# Patient Record
Sex: FEMALE | Race: WHITE | Employment: OTHER | URBAN - NONMETROPOLITAN AREA
[De-identification: names, ages, dates, MRNs, and addresses within clinical notes are randomized per-mention and may not be internally consistent; named-entity substitution may affect disease eponyms.]

---

## 2017-01-27 RX ORDER — ISOSORBIDE DINITRATE 10 MG/1
TABLET ORAL
Qty: 180 TABLET | Refills: 2 | Status: SHIPPED | OUTPATIENT
Start: 2017-01-27 | End: 2017-03-02 | Stop reason: SDUPTHER

## 2017-02-27 ENCOUNTER — TELEPHONE (OUTPATIENT)
Dept: INTERNAL MEDICINE | Age: 82
End: 2017-02-27

## 2017-03-01 LAB
ABSOLUTE BASO #: 0 /CMM (ref 0–200)
ABSOLUTE EOS #: 100 /CMM (ref 0–500)
ABSOLUTE LYMPH #: 900 /CMM (ref 1000–4800)
ABSOLUTE MONO #: 800 /CMM (ref 0–800)
ABSOLUTE NEUT #: 6700 /CMM (ref 1800–7700)
ANION GAP SERPL CALCULATED.3IONS-SCNC: 13 MMOL/L (ref 4–12)
BASOPHILS RELATIVE PERCENT: 0.6 % (ref 0–2)
BUN BLDV-MCNC: 24 MG/DL (ref 7–20)
CALCIUM SERPL-MCNC: 9.6 MG/DL (ref 8.8–10.5)
CHLORIDE BLD-SCNC: 105 MEQ/L (ref 101–111)
CO2: 24 MEQ/L (ref 21–32)
CREAT SERPL-MCNC: 1.3 MG/DL (ref 0.6–1.3)
CREATININE CLEARANCE: 39
EOSINOPHILS RELATIVE PERCENT: 1 % (ref 0–6)
GLUCOSE: 104 MG/DL (ref 70–110)
HCT VFR BLD CALC: 42.7 % (ref 35–44)
HEMOGLOBIN: 14.2 GM/DL (ref 12–15)
LYMPHOCYTES RELATIVE PERCENT: 10.9 % (ref 15–45)
MCH RBC QN AUTO: 29.8 PG (ref 27.5–33)
MCHC RBC AUTO-ENTMCNC: 33.3 GM/DL (ref 33–36)
MCV RBC AUTO: 89.6 CU MIC (ref 80–97)
MONOCYTES RELATIVE PERCENT: 9.1 % (ref 2–10)
NEUTROPHILS RELATIVE PERCENT: 78.4 % (ref 40–70)
NUCLEATED RBCS: 0 /100 WBC
PDW BLD-RTO: 14.6 % (ref 12–16)
PLATELET # BLD: 185 TH/CMM (ref 150–400)
POTASSIUM SERPL-SCNC: 4.3 MEQ/L (ref 3.6–5)
RBC # BLD: 4.76 MIL/CMM (ref 4–5.1)
SODIUM BLD-SCNC: 142 MEQ/L (ref 135–145)
TSH SERPL DL<=0.05 MIU/L-ACNC: 0.31 MCIU/ML (ref 0.49–4.67)
WBC # BLD: 8.6 TH/CMM (ref 4.4–10.5)

## 2017-03-02 ENCOUNTER — OFFICE VISIT (OUTPATIENT)
Dept: INTERNAL MEDICINE | Age: 82
End: 2017-03-02

## 2017-03-02 VITALS
HEIGHT: 58 IN | BODY MASS INDEX: 35.18 KG/M2 | OXYGEN SATURATION: 96 % | SYSTOLIC BLOOD PRESSURE: 170 MMHG | WEIGHT: 167.6 LBS | DIASTOLIC BLOOD PRESSURE: 66 MMHG | HEART RATE: 76 BPM

## 2017-03-02 DIAGNOSIS — Z95.810 AUTOMATIC IMPLANTABLE CARDIOVERTER-DEFIBRILLATOR IN SITU: ICD-10-CM

## 2017-03-02 DIAGNOSIS — E78.5 DYSLIPIDEMIA: ICD-10-CM

## 2017-03-02 DIAGNOSIS — Z95.1 S/P CABG (CORONARY ARTERY BYPASS GRAFT): Primary | ICD-10-CM

## 2017-03-02 DIAGNOSIS — N18.30 CKD (CHRONIC KIDNEY DISEASE) STAGE 3, GFR 30-59 ML/MIN (HCC): ICD-10-CM

## 2017-03-02 DIAGNOSIS — E03.8 OTHER SPECIFIED HYPOTHYROIDISM: ICD-10-CM

## 2017-03-02 PROCEDURE — 4040F PNEUMOC VAC/ADMIN/RCVD: CPT | Performed by: INTERNAL MEDICINE

## 2017-03-02 PROCEDURE — 1090F PRES/ABSN URINE INCON ASSESS: CPT | Performed by: INTERNAL MEDICINE

## 2017-03-02 PROCEDURE — 1036F TOBACCO NON-USER: CPT | Performed by: INTERNAL MEDICINE

## 2017-03-02 PROCEDURE — G8598 ASA/ANTIPLAT THER USED: HCPCS | Performed by: INTERNAL MEDICINE

## 2017-03-02 PROCEDURE — 99214 OFFICE O/P EST MOD 30 MIN: CPT | Performed by: INTERNAL MEDICINE

## 2017-03-02 PROCEDURE — G8427 DOCREV CUR MEDS BY ELIG CLIN: HCPCS | Performed by: INTERNAL MEDICINE

## 2017-03-02 PROCEDURE — 1123F ACP DISCUSS/DSCN MKR DOCD: CPT | Performed by: INTERNAL MEDICINE

## 2017-03-02 PROCEDURE — G8419 CALC BMI OUT NRM PARAM NOF/U: HCPCS | Performed by: INTERNAL MEDICINE

## 2017-03-02 PROCEDURE — G8400 PT W/DXA NO RESULTS DOC: HCPCS | Performed by: INTERNAL MEDICINE

## 2017-03-02 PROCEDURE — G8484 FLU IMMUNIZE NO ADMIN: HCPCS | Performed by: INTERNAL MEDICINE

## 2017-03-02 RX ORDER — LEVOTHYROXINE SODIUM 0.03 MG/1
TABLET ORAL
Qty: 90 TABLET | Refills: 1 | Status: SHIPPED | OUTPATIENT
Start: 2017-03-02 | End: 2017-08-12 | Stop reason: SDUPTHER

## 2017-03-02 RX ORDER — ISOSORBIDE DINITRATE 10 MG/1
TABLET ORAL
Qty: 180 TABLET | Refills: 1 | Status: SHIPPED | OUTPATIENT
Start: 2017-03-02 | End: 2017-08-30 | Stop reason: SDUPTHER

## 2017-03-02 RX ORDER — CARVEDILOL 12.5 MG/1
TABLET ORAL
Qty: 180 TABLET | Refills: 1 | Status: SHIPPED | OUTPATIENT
Start: 2017-03-02 | End: 2017-08-06 | Stop reason: SDUPTHER

## 2017-03-02 ASSESSMENT — PATIENT HEALTH QUESTIONNAIRE - PHQ9
1. LITTLE INTEREST OR PLEASURE IN DOING THINGS: 0
2. FEELING DOWN, DEPRESSED OR HOPELESS: 0
SUM OF ALL RESPONSES TO PHQ QUESTIONS 1-9: 0
SUM OF ALL RESPONSES TO PHQ9 QUESTIONS 1 & 2: 0

## 2017-08-14 RX ORDER — LEVOTHYROXINE SODIUM 0.03 MG/1
TABLET ORAL
Qty: 90 TABLET | Refills: 1 | Status: SHIPPED | OUTPATIENT
Start: 2017-08-14 | End: 2018-02-20

## 2017-08-14 RX ORDER — CARVEDILOL 12.5 MG/1
12.5 TABLET ORAL 2 TIMES DAILY WITH MEALS
Qty: 180 TABLET | Refills: 1 | Status: SHIPPED | OUTPATIENT
Start: 2017-08-14 | End: 2018-02-20 | Stop reason: SDUPTHER

## 2017-08-21 LAB
ALBUMIN SERPL-MCNC: 4.2 GM/DL (ref 3.5–5)
ALP BLD-CCNC: 102 IU/L (ref 39–118)
ALT SERPL-CCNC: 17 IU/L (ref 10–40)
ANION GAP SERPL CALCULATED.3IONS-SCNC: 8 MMOL/L (ref 4–12)
AST SERPL-CCNC: 25 IU/L (ref 15–41)
BILIRUB SERPL-MCNC: 1.5 MG/DL (ref 0.2–1)
BILIRUBIN DIRECT: 0.3 MG/DL (ref 0.1–0.2)
BUN BLDV-MCNC: 30 MG/DL (ref 7–20)
CALCIUM SERPL-MCNC: 9.1 MG/DL (ref 8.8–10.5)
CHLORIDE BLD-SCNC: 107 MEQ/L (ref 101–111)
CHOLESTEROL/HDL RELATIVE RISK: 4.2 (ref 4–4.4)
CHOLESTEROL: 173 MG/DL
CO2: 27 MEQ/L (ref 21–32)
CREAT SERPL-MCNC: 1.31 MG/DL (ref 0.6–1.3)
CREATININE CLEARANCE: 38
DIRECT-LDL / HDL RISK: 3.4
GLUCOSE, FASTING: 108 MG/DL (ref 70–110)
HDLC SERPL-MCNC: 41 MG/DL
LDL CHOLESTEROL DIRECT: 141 MG/DL
POTASSIUM SERPL-SCNC: 4.8 MEQ/L (ref 3.6–5)
SODIUM BLD-SCNC: 142 MEQ/L (ref 135–145)
TOTAL PROTEIN: 7.4 G/DL (ref 6.2–8)
TRIGL SERPL-MCNC: 105 MG/DL
TSH SERPL DL<=0.05 MIU/L-ACNC: 9.46 MCIU/ML (ref 0.49–4.67)
VLDLC SERPL CALC-MCNC: 21 MG/DL

## 2017-08-30 ENCOUNTER — OFFICE VISIT (OUTPATIENT)
Dept: INTERNAL MEDICINE CLINIC | Age: 82
End: 2017-08-30
Payer: MEDICARE

## 2017-08-30 VITALS
DIASTOLIC BLOOD PRESSURE: 60 MMHG | HEIGHT: 58 IN | SYSTOLIC BLOOD PRESSURE: 120 MMHG | HEART RATE: 68 BPM | BODY MASS INDEX: 35.26 KG/M2 | WEIGHT: 168 LBS

## 2017-08-30 DIAGNOSIS — N18.30 CKD (CHRONIC KIDNEY DISEASE) STAGE 3, GFR 30-59 ML/MIN (HCC): ICD-10-CM

## 2017-08-30 DIAGNOSIS — I10 ESSENTIAL HYPERTENSION: ICD-10-CM

## 2017-08-30 DIAGNOSIS — E78.5 DYSLIPIDEMIA: ICD-10-CM

## 2017-08-30 DIAGNOSIS — Z95.1 S/P CABG (CORONARY ARTERY BYPASS GRAFT): Primary | ICD-10-CM

## 2017-08-30 PROCEDURE — 99214 OFFICE O/P EST MOD 30 MIN: CPT | Performed by: INTERNAL MEDICINE

## 2017-08-30 PROCEDURE — 1123F ACP DISCUSS/DSCN MKR DOCD: CPT | Performed by: INTERNAL MEDICINE

## 2017-08-30 PROCEDURE — G8427 DOCREV CUR MEDS BY ELIG CLIN: HCPCS | Performed by: INTERNAL MEDICINE

## 2017-08-30 PROCEDURE — G8417 CALC BMI ABV UP PARAM F/U: HCPCS | Performed by: INTERNAL MEDICINE

## 2017-08-30 PROCEDURE — 1090F PRES/ABSN URINE INCON ASSESS: CPT | Performed by: INTERNAL MEDICINE

## 2017-08-30 PROCEDURE — 1036F TOBACCO NON-USER: CPT | Performed by: INTERNAL MEDICINE

## 2017-08-30 PROCEDURE — 4040F PNEUMOC VAC/ADMIN/RCVD: CPT | Performed by: INTERNAL MEDICINE

## 2017-08-30 PROCEDURE — G8598 ASA/ANTIPLAT THER USED: HCPCS | Performed by: INTERNAL MEDICINE

## 2017-08-30 RX ORDER — ISOSORBIDE DINITRATE 10 MG/1
TABLET ORAL
Qty: 180 TABLET | Refills: 1 | Status: SHIPPED | OUTPATIENT
Start: 2017-08-30 | End: 2018-02-20 | Stop reason: SDUPTHER

## 2018-01-01 ENCOUNTER — OFFICE VISIT (OUTPATIENT)
Dept: INTERNAL MEDICINE CLINIC | Age: 83
End: 2018-01-01
Payer: MEDICARE

## 2018-01-01 ENCOUNTER — TELEPHONE (OUTPATIENT)
Dept: INTERNAL MEDICINE CLINIC | Age: 83
End: 2018-01-01

## 2018-01-01 ENCOUNTER — CARE COORDINATION (OUTPATIENT)
Dept: CARE COORDINATION | Age: 83
End: 2018-01-01

## 2018-01-01 ENCOUNTER — HOSPITAL ENCOUNTER (OUTPATIENT)
Age: 83
Discharge: HOME | End: 2018-08-02
Payer: MEDICARE

## 2018-01-01 ENCOUNTER — CARE COORDINATION (OUTPATIENT)
Dept: CASE MANAGEMENT | Age: 83
End: 2018-01-01

## 2018-01-01 VITALS
BODY MASS INDEX: 32.32 KG/M2 | DIASTOLIC BLOOD PRESSURE: 64 MMHG | HEART RATE: 60 BPM | SYSTOLIC BLOOD PRESSURE: 120 MMHG | OXYGEN SATURATION: 94 % | WEIGHT: 154 LBS | HEIGHT: 58 IN

## 2018-01-01 VITALS
BODY MASS INDEX: 34.43 KG/M2 | WEIGHT: 164 LBS | RESPIRATION RATE: 20 BRPM | SYSTOLIC BLOOD PRESSURE: 121 MMHG | HEART RATE: 80 BPM | HEIGHT: 58 IN | DIASTOLIC BLOOD PRESSURE: 58 MMHG

## 2018-01-01 DIAGNOSIS — I10 ESSENTIAL HYPERTENSION: ICD-10-CM

## 2018-01-01 DIAGNOSIS — I25.10 CORONARY ARTERY DISEASE INVOLVING NATIVE CORONARY ARTERY OF NATIVE HEART WITHOUT ANGINA PECTORIS: ICD-10-CM

## 2018-01-01 DIAGNOSIS — R19.7 DIARRHEA, UNSPECIFIED TYPE: ICD-10-CM

## 2018-01-01 DIAGNOSIS — E03.9 ACQUIRED HYPOTHYROIDISM: ICD-10-CM

## 2018-01-01 DIAGNOSIS — I50.23 ACUTE ON CHRONIC SYSTOLIC CONGESTIVE HEART FAILURE (HCC): Primary | ICD-10-CM

## 2018-01-01 DIAGNOSIS — N18.30 CKD (CHRONIC KIDNEY DISEASE) STAGE 3, GFR 30-59 ML/MIN (HCC): ICD-10-CM

## 2018-01-01 DIAGNOSIS — I10 ESSENTIAL HYPERTENSION: Primary | ICD-10-CM

## 2018-01-01 DIAGNOSIS — R05.9 COUGH: ICD-10-CM

## 2018-01-01 DIAGNOSIS — E78.5 DYSLIPIDEMIA: ICD-10-CM

## 2018-01-01 DIAGNOSIS — I50.22 CHRONIC SYSTOLIC CHF (CONGESTIVE HEART FAILURE) (HCC): Primary | ICD-10-CM

## 2018-01-01 DIAGNOSIS — E03.9 ACQUIRED HYPOTHYROIDISM: Primary | ICD-10-CM

## 2018-01-01 LAB
ANION GAP SERPL CALCULATED.3IONS-SCNC: 17 MEQ/L (ref 8–16)
ANION GAP SERPL CALCULATED.3IONS-SCNC: 7 MMOL/L (ref 4–12)
ANION GAP SERPL CALCULATED.3IONS-SCNC: 8 MMOL/L (ref 4–12)
APPEARANCE: CLEAR
BACTERIA: ABNORMAL
BILIRUBIN URINE: NEGATIVE
BUN BLDV-MCNC: 29 MG/DL (ref 7–20)
BUN BLDV-MCNC: 32 MG/DL (ref 7–20)
BUN BLDV-MCNC: 56 MG/DL (ref 7–22)
CALCIUM SERPL-MCNC: 10.1 MG/DL (ref 8.5–10.5)
CALCIUM SERPL-MCNC: 8.5 MG/DL (ref 8.8–10.5)
CALCIUM SERPL-MCNC: 8.9 MG/DL (ref 8.8–10.5)
CHLORIDE BLD-SCNC: 104 MEQ/L (ref 101–111)
CHLORIDE BLD-SCNC: 104 MEQ/L (ref 98–111)
CHLORIDE BLD-SCNC: 107 MEQ/L (ref 101–111)
CO2: 24 MEQ/L (ref 23–33)
CO2: 26 MEQ/L (ref 21–32)
CO2: 27 MEQ/L (ref 21–32)
COLOR: YELLOW
CREAT SERPL-MCNC: 1.35 MG/DL (ref 0.6–1.3)
CREAT SERPL-MCNC: 1.42 MG/DL (ref 0.6–1.3)
CREAT SERPL-MCNC: 2 MG/DL (ref 0.4–1.2)
CREATININE CLEARANCE: 35
CREATININE CLEARANCE: 37
CREATININE, RANDOM URINE: 75.2 MG/DL
GFR SERPL CREATININE-BSD FRML MDRD: 24 ML/MIN/1.73M2
GLUCOSE BLD-MCNC: 176 MG/DL (ref 70–108)
GLUCOSE URINE: NEGATIVE
GLUCOSE: 107 MG/DL (ref 70–110)
GLUCOSE: 115 MG/DL (ref 70–110)
HYALINE CASTS: ABNORMAL /LPF
INR BLD: 1.5 (ref 2–3)
INR BLD: 1.9 (ref 2–3)
INR BLD: 2.1 (ref 2–3)
INR BLD: 2.3 (ref 2–3)
INR BLD: 2.4 (ref 2–3)
INR BLD: 2.6 (ref 2–3)
INR BLD: 2.6 (ref 2–3)
INR BLD: 2.9 (ref 2–3)
KETONES, URINE: NEGATIVE
LEUKOCYTES, UA: ABNORMAL
MUCUS: PRESENT
NITRITE, URINE: NEGATIVE
PH, URINE: 5 (ref 5–8)
POTASSIUM SERPL-SCNC: 3.6 MEQ/L (ref 3.6–5)
POTASSIUM SERPL-SCNC: 3.9 MEQ/L (ref 3.6–5)
POTASSIUM SERPL-SCNC: 4.2 MEQ/L (ref 3.6–5)
POTASSIUM SERPL-SCNC: 4.7 MEQ/L (ref 3.5–5.2)
PRO-BNP: 2851 PG/ML (ref 0–1800)
PROTEIN, URINE, RANDOM: 9 MG/DL
PROTEIN, URINE: NEGATIVE
PROTEIN/CREAT RATIO: 0.1 G/1.73M2
PROTHROMBIN TIME: 18 SEC (ref 8.7–13.3)
PROTHROMBIN TIME: 23.1 SEC (ref 8.7–13.3)
PROTHROMBIN TIME: 24.6 SEC (ref 8.7–13.3)
PROTHROMBIN TIME: 27.6 SEC (ref 8.7–13.3)
PROTHROMBIN TIME: 28.7 SEC (ref 8.7–13.3)
PROTHROMBIN TIME: 30.7 SEC (ref 8.7–13.3)
PROTHROMBIN TIME: 31.4 SEC (ref 8.7–13.3)
PROTHROMBIN TIME: 35.3 SEC (ref 8.7–13.3)
RBC URINE: ABNORMAL /HPF
SODIUM BLD-SCNC: 139 MEQ/L (ref 135–145)
SODIUM BLD-SCNC: 140 MEQ/L (ref 135–145)
SODIUM BLD-SCNC: 145 MEQ/L (ref 135–145)
SPECIFIC GRAVITY, URINE: 1.01 (ref 1–1.03)
SQUAMOUS EPITHELIAL: ABNORMAL /HPF
T4 FREE: 2.08 NG/DL (ref 0.93–1.76)
TSH REFLEX: 0.89 MCIU/ML (ref 0.49–4.67)
TSH SERPL DL<=0.05 MIU/L-ACNC: 0.34 UIU/ML (ref 0.4–4.2)
URINALYSIS REFLEX: NO
URINE CULTURE, ROUTINE: NORMAL
URINE HGB: NEGATIVE
UROBILINOGEN, URINE: ABNORMAL (ref 0.2–1)
WBC URINE: ABNORMAL /HPF

## 2018-01-01 PROCEDURE — 1101F PT FALLS ASSESS-DOCD LE1/YR: CPT | Performed by: INTERNAL MEDICINE

## 2018-01-01 PROCEDURE — 1123F ACP DISCUSS/DSCN MKR DOCD: CPT | Performed by: INTERNAL MEDICINE

## 2018-01-01 PROCEDURE — 99213 OFFICE O/P EST LOW 20 MIN: CPT | Performed by: INTERNAL MEDICINE

## 2018-01-01 PROCEDURE — G8417 CALC BMI ABV UP PARAM F/U: HCPCS | Performed by: INTERNAL MEDICINE

## 2018-01-01 PROCEDURE — 84439 ASSAY OF FREE THYROXINE: CPT

## 2018-01-01 PROCEDURE — 1036F TOBACCO NON-USER: CPT | Performed by: INTERNAL MEDICINE

## 2018-01-01 PROCEDURE — 1090F PRES/ABSN URINE INCON ASSESS: CPT | Performed by: INTERNAL MEDICINE

## 2018-01-01 PROCEDURE — 80048 BASIC METABOLIC PNL TOTAL CA: CPT

## 2018-01-01 PROCEDURE — 83880 ASSAY OF NATRIURETIC PEPTIDE: CPT

## 2018-01-01 PROCEDURE — 4040F PNEUMOC VAC/ADMIN/RCVD: CPT | Performed by: INTERNAL MEDICINE

## 2018-01-01 PROCEDURE — 84443 ASSAY THYROID STIM HORMONE: CPT

## 2018-01-01 PROCEDURE — G8427 DOCREV CUR MEDS BY ELIG CLIN: HCPCS | Performed by: INTERNAL MEDICINE

## 2018-01-01 PROCEDURE — 99214 OFFICE O/P EST MOD 30 MIN: CPT | Performed by: INTERNAL MEDICINE

## 2018-01-01 PROCEDURE — G8598 ASA/ANTIPLAT THER USED: HCPCS | Performed by: INTERNAL MEDICINE

## 2018-01-01 PROCEDURE — 36415 COLL VENOUS BLD VENIPUNCTURE: CPT

## 2018-01-01 RX ORDER — TORSEMIDE 10 MG/1
10 TABLET ORAL 2 TIMES DAILY
COMMUNITY

## 2018-01-01 RX ORDER — POTASSIUM CHLORIDE 750 MG/1
10 TABLET, EXTENDED RELEASE ORAL DAILY
COMMUNITY
End: 2018-01-01 | Stop reason: SDUPTHER

## 2018-01-01 RX ORDER — ISOSORBIDE DINITRATE 10 MG/1
TABLET ORAL
Qty: 180 TABLET | Refills: 1 | Status: SHIPPED | OUTPATIENT
Start: 2018-01-01 | End: 2019-01-01 | Stop reason: SDUPTHER

## 2018-01-01 RX ORDER — PRAVASTATIN SODIUM 10 MG
10 TABLET ORAL DAILY
Qty: 90 TABLET | Refills: 1 | Status: SHIPPED | OUTPATIENT
Start: 2018-01-01 | End: 2019-01-01 | Stop reason: SDUPTHER

## 2018-01-01 RX ORDER — LOSARTAN POTASSIUM 25 MG/1
TABLET ORAL
Qty: 30 TABLET | Refills: 5 | Status: SHIPPED | OUTPATIENT
Start: 2018-01-01 | End: 2019-01-01 | Stop reason: ALTCHOICE

## 2018-01-01 RX ORDER — HYDRALAZINE HYDROCHLORIDE 10 MG/1
10 TABLET, FILM COATED ORAL 2 TIMES DAILY
COMMUNITY

## 2018-01-01 RX ORDER — LEVOTHYROXINE SODIUM 0.03 MG/1
25 TABLET ORAL DAILY
COMMUNITY
End: 2018-01-01 | Stop reason: SDUPTHER

## 2018-01-01 RX ORDER — LEVOTHYROXINE SODIUM 0.03 MG/1
25 TABLET ORAL DAILY
Qty: 90 TABLET | Refills: 1 | OUTPATIENT
Start: 2018-01-01 | End: 2018-01-01

## 2018-01-01 RX ORDER — TORSEMIDE 10 MG/1
10 TABLET ORAL 2 TIMES DAILY
COMMUNITY
End: 2018-01-01 | Stop reason: DRUGHIGH

## 2018-01-01 RX ORDER — LEVOTHYROXINE SODIUM 0.03 MG/1
25 TABLET ORAL DAILY
Qty: 30 TABLET | Refills: 3 | Status: SHIPPED | OUTPATIENT
Start: 2018-01-01 | End: 2019-01-01 | Stop reason: SDUPTHER

## 2018-01-01 RX ORDER — LOSARTAN POTASSIUM 25 MG/1
25 TABLET ORAL DAILY
Qty: 30 TABLET | Refills: 3 | OUTPATIENT
Start: 2018-01-01 | End: 2018-01-01 | Stop reason: ALTCHOICE

## 2018-01-01 RX ORDER — LEVOCETIRIZINE DIHYDROCHLORIDE 5 MG/1
5 TABLET, FILM COATED ORAL DAILY
COMMUNITY
End: 2019-01-01 | Stop reason: ALTCHOICE

## 2018-01-01 RX ORDER — POTASSIUM CHLORIDE 750 MG/1
10 TABLET, EXTENDED RELEASE ORAL DAILY
Qty: 90 TABLET | Refills: 1 | OUTPATIENT
Start: 2018-01-01 | End: 2019-01-01 | Stop reason: DRUGHIGH

## 2018-01-01 RX ORDER — ISOSORBIDE DINITRATE 10 MG/1
TABLET ORAL
Qty: 180 TABLET | Refills: 1 | Status: SHIPPED | OUTPATIENT
Start: 2018-01-01 | End: 2018-01-01 | Stop reason: SDUPTHER

## 2018-01-01 RX ORDER — GUAIFENESIN AND DEXTROMETHORPHAN HYDROBROMIDE 100; 10 MG/5ML; MG/5ML
5 SOLUTION ORAL EVERY 4 HOURS PRN
COMMUNITY

## 2018-02-09 ENCOUNTER — HOSPITAL ENCOUNTER (EMERGENCY)
Age: 83
Discharge: ANOTHER HOSPITAL | End: 2018-02-09
Payer: MEDICARE

## 2018-02-09 VITALS
SYSTOLIC BLOOD PRESSURE: 209 MMHG | OXYGEN SATURATION: 89 % | TEMPERATURE: 97.5 F | HEART RATE: 62 BPM | BODY MASS INDEX: 34.07 KG/M2 | WEIGHT: 163 LBS | RESPIRATION RATE: 32 BRPM | DIASTOLIC BLOOD PRESSURE: 97 MMHG

## 2018-02-09 DIAGNOSIS — R07.2 PRECORDIAL PAIN: ICD-10-CM

## 2018-02-09 DIAGNOSIS — I25.9 CHEST PAIN DUE TO MYOCARDIAL ISCHEMIA, UNSPECIFIED ISCHEMIC CHEST PAIN TYPE: Primary | ICD-10-CM

## 2018-02-09 LAB
EKG ATRIAL RATE: 340 BPM
EKG Q-T INTERVAL: 490 MS
EKG QRS DURATION: 174 MS
EKG QTC CALCULATION (BAZETT): 521 MS
EKG R AXIS: -52 DEGREES
EKG T AXIS: 114 DEGREES
EKG VENTRICULAR RATE: 68 BPM

## 2018-02-09 PROCEDURE — 99215 OFFICE O/P EST HI 40 MIN: CPT

## 2018-02-09 PROCEDURE — 99203 OFFICE O/P NEW LOW 30 MIN: CPT | Performed by: NURSE PRACTITIONER

## 2018-02-09 ASSESSMENT — ENCOUNTER SYMPTOMS
SHORTNESS OF BREATH: 1
VOMITING: 0
ORTHOPNEA: 0
COUGH: 1
NAUSEA: 0

## 2018-02-09 NOTE — ED PROVIDER NOTES
artery bypass graft; joint replacement; back surgery; Dental surgery; Gallbladder surgery; and Colonoscopy. CURRENT MEDICATIONS       Discharge Medication List as of 2/9/2018 12:12 PM      CONTINUE these medications which have NOT CHANGED    Details   furosemide (LASIX) 40 MG tablet Take 40 mg by mouth every other day       isosorbide dinitrate (ISORDIL) 10 MG tablet TAKE 1 TABLET TWICE A DAY (IMDUR STOPPED), Disp-180 tablet, R-1Normal      carvedilol (COREG) 12.5 MG tablet Take 1 tablet by mouth 2 times daily (with meals), Disp-180 tablet, R-1Normal      levothyroxine (SYNTHROID) 25 MCG tablet TAKE 1 TABLET DAILY IN THE MORNING (NEW LOWER DOSE), Disp-90 tablet, R-1Normal      Misc. Devices (ROLLER WALKER) MISC DAILY Starting 11/17/2016, Until Discontinued, Disp-1 each, R-0, PrintWheeled walker with seat . aspirin EC 81 MG EC tablet Take 81 mg by mouth daily      potassium chloride SA (K-DUR;KLOR-CON M) 20 MEQ tablet 1 tab on Mon. Wed. & Fri.      warfarin (COUMADIN) 2.5 MG tablet Followed at [facility]      amiodarone (PACERONE) 100 MG tablet Take 100 mg by mouth daily      pravastatin (PRAVACHOL) 10 MG tablet Take 10 mg by mouth daily      losartan (COZAAR) 100 MG tablet Take 1 tablet by mouth daily. , Disp-90 tablet, R-2      nitroGLYCERIN (NITROSTAT) 0.4 MG SL tablet Place 1 tablet under the tongue every 5 minutes as needed. , Disp-25 tablet, R-5      Coenzyme Q10 (CO Q 10) 100 MG CAPS Take  by mouth daily. acetaminophen (TYLENOL ARTHRITIS PAIN) 650 MG CR tablet Take 650 mg by mouth nightly as needed. ALLERGIES     Patient is has No Known Allergies. FAMILY HISTORY     Patient's family history includes Stroke in her father. SOCIAL HISTORY     Patient  reports that she has never smoked. She has never used smokeless tobacco. She reports that she does not drink alcohol or use drugs.     PHYSICAL EXAM     ED TRIAGE VITALS  BP: (!) 209/97, Temp: 97.5 °F (36.4 °C), Pulse: 62, Resp: (!) 32, SpO2: (!) 89 %  Physical Exam   Constitutional: She is oriented to person, place, and time. She appears well-developed and well-nourished. No distress. Neck: Normal range of motion. Neck supple. Cardiovascular: Normal rate, regular rhythm and normal heart sounds. Pulmonary/Chest: Effort normal and breath sounds normal.   Abdominal: Soft. Bowel sounds are normal.   Musculoskeletal: Normal range of motion. Neurological: She is alert and oriented to person, place, and time. Skin: Skin is warm and dry. No rash noted. Mild bilateral edema lower legs   Psychiatric: She has a normal mood and affect. Her behavior is normal. Judgment and thought content normal.   Nursing note and vitals reviewed. DIAGNOSTIC RESULTS   Labs:  Results for orders placed or performed during the hospital encounter of 02/09/18   EKG 12 Lead   Result Value Ref Range    Ventricular Rate 68 BPM    Atrial Rate 340 BPM    QRS Duration 174 ms    Q-T Interval 490 ms    QTc Calculation (Bazett) 521 ms    R Axis -52 degrees    T Axis 114 degrees       IMAGING:  No orders to display     URGENT CARE COURSE:     Vitals:    02/09/18 1140   BP: (!) 209/97   Pulse: 62   Resp: (!) 32   Temp: 97.5 °F (36.4 °C)   SpO2: (!) 89%   Weight: 163 lb (73.9 kg)       Medications - No data to display  PROCEDURES:  None  FINAL IMPRESSION      1. Chest pain due to myocardial ischemia, unspecified ischemic chest pain type ([address]    2.  Precordial pain        DISPOSITION/PLAN   DISPOSITION Decision To Transfer 02/09/2018 12:04:21 PM    PATIENT REFERRED TO:  [facility]      go now    DISCHARGE MEDICATIONS:  Discharge Medication List as of 2/9/2018 12:12 PM        Discharge Medication List as of 2/9/2018 12:12 PM          Pedro Luis Hoyos, [address] Mary[address] Fine, CNP  02/11/18 [address] [place] [address], [facility]  02/11/18 [id number]

## 2018-02-20 ENCOUNTER — OFFICE VISIT (OUTPATIENT)
Dept: INTERNAL MEDICINE CLINIC | Age: 83
End: 2018-02-20
Payer: MEDICARE

## 2018-02-20 VITALS
BODY MASS INDEX: 34.43 KG/M2 | HEIGHT: 58 IN | SYSTOLIC BLOOD PRESSURE: 132 MMHG | WEIGHT: 164 LBS | HEART RATE: 64 BPM | OXYGEN SATURATION: 92 % | DIASTOLIC BLOOD PRESSURE: 60 MMHG

## 2018-02-20 DIAGNOSIS — N18.30 CKD (CHRONIC KIDNEY DISEASE) STAGE 3, GFR 30-59 ML/MIN (HCC): ICD-10-CM

## 2018-02-20 DIAGNOSIS — E03.9 ACQUIRED HYPOTHYROIDISM: ICD-10-CM

## 2018-02-20 DIAGNOSIS — E78.5 DYSLIPIDEMIA: ICD-10-CM

## 2018-02-20 DIAGNOSIS — N30.01 ACUTE CYSTITIS WITH HEMATURIA: Primary | ICD-10-CM

## 2018-02-20 DIAGNOSIS — I25.10 CORONARY ARTERY DISEASE INVOLVING NATIVE CORONARY ARTERY OF NATIVE HEART WITHOUT ANGINA PECTORIS: ICD-10-CM

## 2018-02-20 DIAGNOSIS — I10 ESSENTIAL HYPERTENSION: ICD-10-CM

## 2018-02-20 PROCEDURE — 1090F PRES/ABSN URINE INCON ASSESS: CPT | Performed by: INTERNAL MEDICINE

## 2018-02-20 PROCEDURE — 4040F PNEUMOC VAC/ADMIN/RCVD: CPT | Performed by: INTERNAL MEDICINE

## 2018-02-20 PROCEDURE — 1036F TOBACCO NON-USER: CPT | Performed by: INTERNAL MEDICINE

## 2018-02-20 PROCEDURE — G8427 DOCREV CUR MEDS BY ELIG CLIN: HCPCS | Performed by: INTERNAL MEDICINE

## 2018-02-20 PROCEDURE — G8598 ASA/ANTIPLAT THER USED: HCPCS | Performed by: INTERNAL MEDICINE

## 2018-02-20 PROCEDURE — 1123F ACP DISCUSS/DSCN MKR DOCD: CPT | Performed by: INTERNAL MEDICINE

## 2018-02-20 PROCEDURE — G8484 FLU IMMUNIZE NO ADMIN: HCPCS | Performed by: INTERNAL MEDICINE

## 2018-02-20 PROCEDURE — 99214 OFFICE O/P EST MOD 30 MIN: CPT | Performed by: INTERNAL MEDICINE

## 2018-02-20 PROCEDURE — G8417 CALC BMI ABV UP PARAM F/U: HCPCS | Performed by: INTERNAL MEDICINE

## 2018-02-20 RX ORDER — LEVOTHYROXINE SODIUM 0.05 MG/1
50 TABLET ORAL DAILY
Qty: 90 TABLET | Refills: 1 | Status: SHIPPED | OUTPATIENT
Start: 2018-02-20 | End: 2018-05-01 | Stop reason: SDUPTHER

## 2018-02-20 RX ORDER — LEVOTHYROXINE SODIUM 0.03 MG/1
TABLET ORAL
Qty: 90 TABLET | Refills: 1 | Status: CANCELLED | OUTPATIENT
Start: 2018-02-20

## 2018-02-20 RX ORDER — PRAVASTATIN SODIUM 10 MG
10 TABLET ORAL DAILY
Qty: 90 TABLET | Refills: 1 | Status: SHIPPED | OUTPATIENT
Start: 2018-02-20 | End: 2018-05-01 | Stop reason: SDUPTHER

## 2018-02-20 RX ORDER — CARVEDILOL 12.5 MG/1
12.5 TABLET ORAL 2 TIMES DAILY WITH MEALS
Qty: 180 TABLET | Refills: 1 | Status: SHIPPED | OUTPATIENT
Start: 2018-02-20 | End: 2018-05-01 | Stop reason: DRUGHIGH

## 2018-02-20 RX ORDER — ISOSORBIDE DINITRATE 10 MG/1
TABLET ORAL
Qty: 180 TABLET | Refills: 1 | Status: SHIPPED | OUTPATIENT
Start: 2018-02-20 | End: 2018-03-09 | Stop reason: SDUPTHER

## 2018-02-20 NOTE — PROGRESS NOTES
benefit from insert in shoe. I asked her to have PT send me a note about what they thought would work best.  Or just send to [employer]. Past Medical History:   Diagnosis Date    CAD (coronary artery disease)     [address] (congestive heart failure) 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     Left ventricular dysfunction     S/P CABG (coronary artery bypass graft)        Past Surgical History:   Procedure Laterality Date    BACK SURGERY      COLONOSCOPY      CORONARY ARTERY BYPASS GRAFT      DENTAL SURGERY      GALLBLADDER SURGERY      JOINT REPLACEMENT      hip       Current Outpatient Prescriptions   Medication Sig Dispense Refill    carvedilol (COREG) 12.5 MG tablet Take 1 tablet by mouth 2 times daily (with meals) 180 tablet 1    isosorbide dinitrate (ISORDIL) 10 MG tablet TAKE 1 TABLET TWICE A DAY (IMDUR STOPPED) 180 tablet 1    pravastatin (PRAVACHOL) 10 MG tablet Take 1 tablet by mouth daily 90 tablet 1    levothyroxine (SYNTHROID) 50 MCG tablet Take 1 tablet by mouth Daily 90 tablet 1    aspirin EC 81 MG EC tablet Take 81 mg by mouth daily      potassium chloride SA (K-DUR;KLOR-CON M) 20 MEQ tablet 1 tab on Mon. Wed. & Fri.      warfarin (COUMADIN) 2.5 MG tablet Followed at [facility]      amiodarone (PACERONE) 100 MG tablet Take 100 mg by mouth daily      furosemide (LASIX) 40 MG tablet Take 40 mg by mouth every other day       nitroGLYCERIN (NITROSTAT) 0.4 MG SL tablet Place 1 tablet under the tongue every 5 minutes as needed. 25 tablet 5    Coenzyme Q10 (CO Q 10) 100 MG CAPS Take  by mouth daily.  acetaminophen (TYLENOL ARTHRITIS PAIN) 650 MG CR tablet Take 650 mg by mouth nightly as needed.  Misc. Devices (ROLLER WALKER) MISC 1 each by Does not apply route daily Wheeled walker with seat . 1 each 0     No current facility-administered medications for this visit.         No Known Allergies    Social History

## 2018-02-22 ENCOUNTER — TELEPHONE (OUTPATIENT)
Dept: INTERNAL MEDICINE CLINIC | Age: 83
End: 2018-02-22

## 2018-02-22 RX ORDER — FUROSEMIDE 20 MG/1
20 TABLET ORAL DAILY
COMMUNITY
End: 2018-02-22

## 2018-02-22 NOTE — TELEPHONE ENCOUNTER
Spoke to [facility] nurse and clarified the Lasix dose. AVS from 2/20/18 and medication list faxed to them.

## 2018-02-22 NOTE — TELEPHONE ENCOUNTER
[facility] nurse called wanting to clarify Lasix dose. According to their orders from pt's discharge she is to be on Lasix 20 mg daily. Pt's Rx bottle and pt stated she is on 40 mg every other day. Please clarify what does you want pt to be on.

## 2018-02-28 LAB
INR BLD: 2.8 (ref 2–3)
PROTHROMBIN TIME: 34.2 SEC (ref 8.7–13.3)

## 2018-03-04 LAB — URINE CULTURE, ROUTINE: NORMAL

## 2018-03-06 LAB
INR BLD: 2.2 (ref 2–3)
PROTHROMBIN TIME: 26.8 SEC (ref 8.7–13.3)

## 2018-03-07 ENCOUNTER — TELEPHONE (OUTPATIENT)
Dept: INTERNAL MEDICINE CLINIC | Age: 83
End: 2018-03-07

## 2018-03-07 NOTE — TELEPHONE ENCOUNTER
----- Message from Erica James MD sent at 3/4/2018  9:52 PM EST -----  Tell her to stop potassium - level high at 5.2, and ask what weight is now. Is she still taking Lasix daily? Repeat BMP when repeat TSH in a month.

## 2018-03-08 LAB
ANION GAP SERPL CALCULATED.3IONS-SCNC: 10 MMOL/L (ref 4–12)
BUN BLDV-MCNC: 26 MG/DL (ref 7–20)
CALCIUM SERPL-MCNC: 9.5 MG/DL (ref 8.8–10.5)
CHLORIDE BLD-SCNC: 102 MEQ/L (ref 101–111)
CO2: 26 MEQ/L (ref 21–32)
CREAT SERPL-MCNC: 1.24 MG/DL (ref 0.6–1.3)
CREATININE CLEARANCE: 41
GLUCOSE: 121 MG/DL (ref 70–110)
POTASSIUM SERPL-SCNC: 4.4 MEQ/L (ref 3.6–5)
SODIUM BLD-SCNC: 138 MEQ/L (ref 135–145)

## 2018-03-09 DIAGNOSIS — I25.10 CORONARY ARTERY DISEASE INVOLVING NATIVE CORONARY ARTERY OF NATIVE HEART WITHOUT ANGINA PECTORIS: ICD-10-CM

## 2018-03-09 LAB
APPEARANCE: ABNORMAL
BILIRUBIN URINE: NEGATIVE
COLOR: YELLOW
GLUCOSE URINE: NEGATIVE
HYALINE CASTS: ABNORMAL /LPF
KETONES, URINE: NEGATIVE
LEUKOCYTES, UA: ABNORMAL
MUCUS: PRESENT
NITRITE, URINE: NEGATIVE
PH, URINE: 5 (ref 5–8)
PROTEIN, URINE: NEGATIVE
RBC URINE: ABNORMAL /HPF
SPECIFIC GRAVITY, URINE: 1.02 (ref 1–1.03)
SQUAMOUS EPITHELIAL: ABNORMAL /HPF
URINALYSIS REFLEX: YES
URINE HGB: ABNORMAL
UROBILINOGEN, URINE: ABNORMAL (ref 0.2–1)
WBC URINE: ABNORMAL /HPF

## 2018-03-09 RX ORDER — ISOSORBIDE DINITRATE 10 MG/1
TABLET ORAL
Qty: 180 TABLET | Refills: 1 | Status: SHIPPED | OUTPATIENT
Start: 2018-03-09 | End: 2018-05-01 | Stop reason: SDUPTHER

## 2018-03-11 LAB — URINE CULTURE REFLEX: NORMAL

## 2018-03-19 LAB
INR BLD: 2.9 (ref 2–3)
PROTHROMBIN TIME: 34.9 SEC (ref 8.7–13.3)

## 2018-03-22 ENCOUNTER — TELEPHONE (OUTPATIENT)
Dept: INTERNAL MEDICINE CLINIC | Age: 83
End: 2018-03-22

## 2018-03-22 LAB
INR BLD: 2.9 (ref 2–3)
PROTHROMBIN TIME: 34.6 SEC (ref 8.7–13.3)

## 2018-04-09 LAB
INR BLD: 3.3 (ref 2–3)
PROTHROMBIN TIME: 39.2 SEC (ref 8.7–13.3)

## 2018-05-01 ENCOUNTER — OFFICE VISIT (OUTPATIENT)
Dept: INTERNAL MEDICINE CLINIC | Age: 83
End: 2018-05-01
Payer: MEDICARE

## 2018-05-01 VITALS
SYSTOLIC BLOOD PRESSURE: 120 MMHG | HEART RATE: 66 BPM | DIASTOLIC BLOOD PRESSURE: 60 MMHG | WEIGHT: 165 LBS | BODY MASS INDEX: 34.63 KG/M2 | OXYGEN SATURATION: 95 % | HEIGHT: 58 IN

## 2018-05-01 DIAGNOSIS — E03.9 ACQUIRED HYPOTHYROIDISM: ICD-10-CM

## 2018-05-01 DIAGNOSIS — I25.10 CORONARY ARTERY DISEASE INVOLVING NATIVE CORONARY ARTERY OF NATIVE HEART WITHOUT ANGINA PECTORIS: ICD-10-CM

## 2018-05-01 DIAGNOSIS — E78.5 DYSLIPIDEMIA: ICD-10-CM

## 2018-05-01 DIAGNOSIS — I10 ESSENTIAL HYPERTENSION: Primary | ICD-10-CM

## 2018-05-01 DIAGNOSIS — N18.30 CKD (CHRONIC KIDNEY DISEASE) STAGE 3, GFR 30-59 ML/MIN (HCC): ICD-10-CM

## 2018-05-01 DIAGNOSIS — Z23 NEED FOR PROPHYLACTIC VACCINATION AGAINST STREPTOCOCCUS PNEUMONIAE (PNEUMOCOCCUS) AND INFLUENZA: ICD-10-CM

## 2018-05-01 PROCEDURE — 4040F PNEUMOC VAC/ADMIN/RCVD: CPT | Performed by: INTERNAL MEDICINE

## 2018-05-01 PROCEDURE — G0009 ADMIN PNEUMOCOCCAL VACCINE: HCPCS | Performed by: INTERNAL MEDICINE

## 2018-05-01 PROCEDURE — 1123F ACP DISCUSS/DSCN MKR DOCD: CPT | Performed by: INTERNAL MEDICINE

## 2018-05-01 PROCEDURE — G8417 CALC BMI ABV UP PARAM F/U: HCPCS | Performed by: INTERNAL MEDICINE

## 2018-05-01 PROCEDURE — 1090F PRES/ABSN URINE INCON ASSESS: CPT | Performed by: INTERNAL MEDICINE

## 2018-05-01 PROCEDURE — G8427 DOCREV CUR MEDS BY ELIG CLIN: HCPCS | Performed by: INTERNAL MEDICINE

## 2018-05-01 PROCEDURE — 99213 OFFICE O/P EST LOW 20 MIN: CPT | Performed by: INTERNAL MEDICINE

## 2018-05-01 PROCEDURE — G8598 ASA/ANTIPLAT THER USED: HCPCS | Performed by: INTERNAL MEDICINE

## 2018-05-01 PROCEDURE — 90732 PPSV23 VACC 2 YRS+ SUBQ/IM: CPT | Performed by: INTERNAL MEDICINE

## 2018-05-01 PROCEDURE — 1036F TOBACCO NON-USER: CPT | Performed by: INTERNAL MEDICINE

## 2018-05-01 RX ORDER — LOSARTAN POTASSIUM 25 MG/1
25 TABLET ORAL DAILY
Qty: 30 TABLET | Refills: 3 | Status: SHIPPED | OUTPATIENT
Start: 2018-05-01 | End: 2018-01-01 | Stop reason: SDUPTHER

## 2018-05-01 RX ORDER — ISOSORBIDE DINITRATE 10 MG/1
TABLET ORAL
Qty: 180 TABLET | Refills: 1 | Status: SHIPPED | OUTPATIENT
Start: 2018-05-01 | End: 2018-01-01 | Stop reason: SDUPTHER

## 2018-05-01 RX ORDER — LISINOPRIL 5 MG/1
5 TABLET ORAL DAILY
COMMUNITY
End: 2018-05-01

## 2018-05-01 RX ORDER — LEVOTHYROXINE SODIUM 0.05 MG/1
50 TABLET ORAL DAILY
Qty: 90 TABLET | Refills: 1 | Status: SHIPPED | OUTPATIENT
Start: 2018-05-01 | End: 2018-01-01 | Stop reason: DRUGHIGH

## 2018-05-01 RX ORDER — PRAVASTATIN SODIUM 10 MG
10 TABLET ORAL DAILY
Qty: 90 TABLET | Refills: 1 | Status: SHIPPED | OUTPATIENT
Start: 2018-05-01 | End: 2018-01-01 | Stop reason: SDUPTHER

## 2018-05-01 RX ORDER — POTASSIUM CHLORIDE 20 MEQ/1
10 TABLET, EXTENDED RELEASE ORAL DAILY
COMMUNITY
End: 2018-01-01 | Stop reason: ALTCHOICE

## 2018-05-01 RX ORDER — CARVEDILOL 25 MG/1
25 TABLET ORAL 2 TIMES DAILY WITH MEALS
COMMUNITY

## 2018-05-01 ASSESSMENT — PATIENT HEALTH QUESTIONNAIRE - PHQ9
2. FEELING DOWN, DEPRESSED OR HOPELESS: 0
SUM OF ALL RESPONSES TO PHQ9 QUESTIONS 1 & 2: 0
1. LITTLE INTEREST OR PLEASURE IN DOING THINGS: 0
SUM OF ALL RESPONSES TO PHQ QUESTIONS 1-9: 0

## 2018-05-02 ENCOUNTER — TELEPHONE (OUTPATIENT)
Dept: INTERNAL MEDICINE CLINIC | Age: 83
End: 2018-05-02

## 2018-07-01 NOTE — TELEPHONE ENCOUNTER
She is currently on levothyroxine 50 mcg daily and was a new start on this 8/2017 - repeat labs 5/2018 when I took her over from Dr. Ivana Madison were normal.  Now additional labs done 6/2018 and she is hyperthyroid. Need to decrease to 25 mcg and repeat TSH with free T4 in 6 weeks.

## 2018-07-02 NOTE — TELEPHONE ENCOUNTER
Called in to [facility] on [address] and spoke to Swetha Barros. Advised them dose decrease on the thyroid.

## 2018-07-02 NOTE — TELEPHONE ENCOUNTER
Spoke to pt and advised her need to decrease thyroid dose and check labs in 6 weeks. She also asked for a potassium script to be called in. I advised her potassium comes in a 10 meq and asked her if she wanted that size as she is cutting the 20 meq in half. She is agreeable to that.

## 2018-07-24 NOTE — TELEPHONE ENCOUNTER
from [facility] called stating that Ingrid Castillo is being discharged from the hospital with home health and she was asking if you will follow the patient's home health care . Please advise .

## 2018-08-02 NOTE — PATIENT INSTRUCTIONS
Xyzal 1 tab daily - OTC for running nose, cough. Stop levothyroxine as free T4 increasing - will need lab in about 4-6 weeks to recheck and make sure not getting too low again. Diarrhea - will refer to GI. Call if sputum white and bubbly, or weight up 3# (158#). Get labs now and will see back in a month.

## 2018-08-02 NOTE — TELEPHONE ENCOUNTER
Faxed Referral[facility]ographics to [facility] at [phone] attn: Nilesh Em.   She will call pt to schedule a appointment

## 2018-08-02 NOTE — PROGRESS NOTES
10 MG tablet TAKE 1 TABLET TWICE A DAY (IMDUR STOPPED) 180 tablet 1    potassium chloride (KLOR-CON M) 10 MEQ extended release tablet Take 1 tablet by mouth daily (Patient taking differently: Take 10 mEq by mouth 2 times daily ) 90 tablet 1    losartan (COZAAR) 25 MG tablet Take 1 tablet by mouth daily 30 tablet 3    carvedilol (COREG) 25 MG tablet Take 25 mg by mouth 2 times daily (with meals)      Misc. Devices (ROLLER WALKER) MISC 1 each by Does not apply route daily Wheeled walker with seat . 1 each 0    aspirin EC 81 MG EC tablet Take 81 mg by mouth daily      warfarin (COUMADIN) 2.5 MG tablet Followed at [facility]      amiodarone (PACERONE) 100 MG tablet Take 100 mg by mouth daily      nitroGLYCERIN (NITROSTAT) 0.4 MG SL tablet Place 1 tablet under the tongue every 5 minutes as needed. 25 tablet 5    Coenzyme Q10 (CO Q 10) 100 MG CAPS Take  by mouth daily.  acetaminophen (TYLENOL ARTHRITIS PAIN) 650 MG CR tablet Take 650 mg by mouth nightly as needed.  torsemide (DEMADEX) 10 MG tablet Take 20 mg by mouth 2 times daily       No current facility-administered medications for this visit. Allergies   Allergen Reactions    Lisinopril      Cough         Social History     Social History    Marital status:      Spouse name: N/A    Number of children: N/A    Years of education: N/A     Occupational History    Not on file.      Social History Main Topics    Smoking status: Never Smoker    Smokeless tobacco: Never Used    Alcohol use No    Drug use: No    Sexual activity: Not on file     Other Topics Concern    Not on file     Social History Narrative    No narrative on file       Family History   Problem Relation Age of Onset    Stroke Father        Review of Systems - General ROS: negative for - chills or fever  Psychological ROS: negative for - anxiety and depression  Hematological and Lymphatic ROS: No history of blood clots or bleeding

## 2018-08-03 NOTE — TELEPHONE ENCOUNTER
----- Message from Beronica Myers MD sent at 8/3/2018 10:57 AM EDT -----  Numbers look like she is fluid overloaded. Increase Demadex to 10 mg TID. Call with report on cough, breathing, weights on Monday.

## 2018-08-09 NOTE — TELEPHONE ENCOUNTER
Change Demadex to 20 mg BID and monitor weights daily and call Monday with report. Check BMP and BNP next week.

## 2018-08-09 NOTE — TELEPHONE ENCOUNTER
Notified pt to increase demadex from 10 mg. Tid. To 20 mg. Bid. And weigh daily. She is to call in an update on Monday am 8/13/18. She verbalizes understanding.

## 2018-08-09 NOTE — TELEPHONE ENCOUNTER
Finally able to reach pt. She states she is using demadex 10 mg. Tid. She does not notice an increase in urine output. Weight is 154 lbs. Today which that is what she was in the office. She states her breathing is much better and cough is better, only has a little bit of a cough. Pt does not write down her weights daily but does weigh and encouraged her to write them down daily.

## 2018-08-12 PROBLEM — I50.23 ACUTE ON CHRONIC SYSTOLIC CONGESTIVE HEART FAILURE (HCC): Status: ACTIVE | Noted: 2018-01-01

## 2018-08-27 NOTE — TELEPHONE ENCOUNTER
Notified patient that her urinalysis did not show any infection and that her creatinine is improving . Patient states that she is still having diarrhea but it is only maybe once a week . Do you still want patient to complete the stool studies ?

## 2018-08-27 NOTE — TELEPHONE ENCOUNTER
----- Message from Janice Sauer MD sent at 8/27/2018  8:57 AM EDT -----  Let her know UA negative for infection - Cr improving. Ask if still having diarrhea - she didn't do stool studies.

## 2018-09-12 NOTE — TELEPHONE ENCOUNTER
I spoke with karly at [facility] she voiced she understood recommendation and will notify coumadin clinic and speak with pt cardiololgist

## 2018-09-12 NOTE — TELEPHONE ENCOUNTER
Patient is seeing cardiology regarding these medications. But just make sure [facility] cou[facility] clinic is aware of amiodarone and Coumadin as this can affect INR. The issue with aspirin and Coumadin is bleeding risk and she is on these for CAD and A. Fib but no history of bleeding - tolerates this combination.

## 2018-10-04 NOTE — TELEPHONE ENCOUNTER
Faxed Referral, [facility], notes to [phone] Attn: [id number]. Per Roberto Calloawy she will call pt to schedule appt. Also, left a msg with pt's Granddaughter Norman Poles that Roberto Calloway would be calling her to make appt for pt.

## 2019-01-01 ENCOUNTER — TELEPHONE (OUTPATIENT)
Dept: INTERNAL MEDICINE CLINIC | Age: 84
End: 2019-01-01

## 2019-01-01 ENCOUNTER — OFFICE VISIT (OUTPATIENT)
Dept: INTERNAL MEDICINE CLINIC | Age: 84
End: 2019-01-01
Payer: MEDICARE

## 2019-01-01 VITALS
HEART RATE: 67 BPM | WEIGHT: 155 LBS | BODY MASS INDEX: 32.54 KG/M2 | SYSTOLIC BLOOD PRESSURE: 116 MMHG | HEIGHT: 58 IN | OXYGEN SATURATION: 95 % | DIASTOLIC BLOOD PRESSURE: 58 MMHG | RESPIRATION RATE: 14 BRPM

## 2019-01-01 DIAGNOSIS — I50.22 CHRONIC SYSTOLIC CHF (CONGESTIVE HEART FAILURE) (HCC): ICD-10-CM

## 2019-01-01 DIAGNOSIS — I25.10 CORONARY ARTERY DISEASE INVOLVING NATIVE CORONARY ARTERY OF NATIVE HEART WITHOUT ANGINA PECTORIS: ICD-10-CM

## 2019-01-01 DIAGNOSIS — I10 ESSENTIAL HYPERTENSION: ICD-10-CM

## 2019-01-01 DIAGNOSIS — I48.19 ATRIAL FIBRILLATION, PERSISTENT (HCC): ICD-10-CM

## 2019-01-01 DIAGNOSIS — E03.9 ACQUIRED HYPOTHYROIDISM: Primary | ICD-10-CM

## 2019-01-01 DIAGNOSIS — E78.5 DYSLIPIDEMIA: Primary | ICD-10-CM

## 2019-01-01 DIAGNOSIS — E87.6 HYPOKALEMIA: ICD-10-CM

## 2019-01-01 DIAGNOSIS — Z91.81 AT HIGH RISK FOR FALLS: ICD-10-CM

## 2019-01-01 DIAGNOSIS — N18.30 CKD (CHRONIC KIDNEY DISEASE) STAGE 3, GFR 30-59 ML/MIN (HCC): ICD-10-CM

## 2019-01-01 DIAGNOSIS — E78.5 DYSLIPIDEMIA: ICD-10-CM

## 2019-01-01 DIAGNOSIS — E03.9 ACQUIRED HYPOTHYROIDISM: ICD-10-CM

## 2019-01-01 DIAGNOSIS — Z79.899 ON STATIN THERAPY: ICD-10-CM

## 2019-01-01 LAB
ALT SERPL-CCNC: 19 IU/L (ref 10–40)
ANION GAP SERPL CALCULATED.3IONS-SCNC: 14 MMOL/L (ref 4–12)
BUN BLDV-MCNC: 19 MG/DL (ref 7–20)
CALCIUM SERPL-MCNC: 8.5 MG/DL (ref 8.8–10.5)
CHLORIDE BLD-SCNC: 105 MEQ/L (ref 101–111)
CO2: 21 MEQ/L (ref 21–32)
CREAT SERPL-MCNC: 1.71 MG/DL (ref 0.6–1.3)
CREATININE CLEARANCE: 28
GLUCOSE: 128 MG/DL (ref 70–110)
LDL CHOLESTEROL DIRECT: 138 MG/DL
POTASSIUM SERPL-SCNC: 3.5 MEQ/L (ref 3.6–5)
SODIUM BLD-SCNC: 140 MEQ/L (ref 135–145)
T4 FREE: 1.32 NG/DL (ref 0.61–1.12)
TSH SERPL DL<=0.05 MIU/L-ACNC: 5.3 MCIU/ML (ref 0.49–4.67)

## 2019-01-01 PROCEDURE — 99214 OFFICE O/P EST MOD 30 MIN: CPT | Performed by: NURSE PRACTITIONER

## 2019-01-01 RX ORDER — PRAVASTATIN SODIUM 10 MG
20 TABLET ORAL DAILY
Qty: 90 TABLET | Refills: 1 | Status: SHIPPED | COMMUNITY
Start: 2019-01-01

## 2019-01-01 RX ORDER — PRAVASTATIN SODIUM 10 MG
10 TABLET ORAL DAILY
Qty: 90 TABLET | Refills: 1 | Status: SHIPPED | OUTPATIENT
Start: 2019-01-01 | End: 2019-01-01 | Stop reason: DRUGHIGH

## 2019-01-01 RX ORDER — POTASSIUM CHLORIDE 750 MG/1
10 TABLET, EXTENDED RELEASE ORAL 2 TIMES DAILY
Qty: 60 TABLET | Refills: 2 | Status: SHIPPED
Start: 2019-01-01

## 2019-01-01 RX ORDER — LEVOTHYROXINE SODIUM 0.03 MG/1
25 TABLET ORAL DAILY
Qty: 30 TABLET | Refills: 3 | Status: SHIPPED | OUTPATIENT
Start: 2019-01-01

## 2019-01-01 RX ORDER — CHOLECALCIFEROL (VITAMIN D3) 1250 MCG
CAPSULE ORAL WEEKLY
COMMUNITY

## 2019-01-01 RX ORDER — ISOSORBIDE DINITRATE 10 MG/1
TABLET ORAL
Qty: 180 TABLET | Refills: 1 | Status: SHIPPED | OUTPATIENT
Start: 2019-01-01

## 2019-01-01 ASSESSMENT — PATIENT HEALTH QUESTIONNAIRE - PHQ9
SUM OF ALL RESPONSES TO PHQ QUESTIONS 1-9: 0
SUM OF ALL RESPONSES TO PHQ9 QUESTIONS 1 & 2: 0
SUM OF ALL RESPONSES TO PHQ QUESTIONS 1-9: 0
1. LITTLE INTEREST OR PLEASURE IN DOING THINGS: 0
2. FEELING DOWN, DEPRESSED OR HOPELESS: 0

## 2019-03-22 NOTE — PROGRESS NOTES
[facility] PROFESSIONAL SERVS  PHYSICIANS [address] [place] [address], [address]  Dept: [phone]  Dept Fax: [phone]      Leia Silva  80 y.o.  [id number]  4/27/19    Chief Complaint   Patient presents with    Follow-Up from Hospital     discharged from [facility] on 3/1/19 for CHF       Current concerns - Leia Silva presents for post hospital follow up. This patient is followed regularly by Dr. Lili Francis, last seen 9/27/18. She states she fell twice in two days. Once when she leaned over to  a sandwich from the floor and hit her face. She states she bled quite a bit into her arm due to being on Coumadin. She went into ED and was released. She fell out of a chair the next morning as she went to stand up and got her foot tangled in the chair. She denies having become light headed, dizzy, does not feel she passed out. She was taken back in and admitted at [facility], where she was inpatient for almost 2 weeks    HTN - /58. Pulse 67. No light headedness or unsteadiness. On carvedilol 25 mg twice daily, hydralazine 10 mg twice daily    CHF - On carvedilol 25 mg twice daily, hydralazine 10 mg twice daily, demadex 10 mg twice daily, and potassium 10 meq twice daily. Follows fluid restrictions, sodium restrictions per cardiology. CAD - Followed by Dr. Sam Gamez. [facility]. CKD - Follows with Dr. Suzanne Sullivan at [facility]. Hypothyroidism - Synthroid 25 mcg daily, She denies any fatigue, cold/heat intolerance, hoarseness of voice, fullness in throat, dysphagia, dysphonia, sandiness of eyes, hair loss or weight changes. Proptosis none. Energy levels are stable. She is taking her Synthroid 25 mcg with water at least 30 minutes prior to eating and 4 hours from any multivitamin/supplements.  Last TSH was 3.752 12/11/18 (update: TSH 5.303, free t4 1.32 on 4/2/19, this was 3 weeks ago and did not receive this results, will recheck in one week and redose Synthroid)    Diarrhea - none since being out of the hospital.     Vitamin D deficiency - on 36791 units weekly per nephro. Last vitamin D level 19 on 12/11/18      Past Medical History:   Diagnosis Date    Atrial fibrillation (HCC)     CAD (coronary artery disease)     CHF (congestive heart failure) ([facility])     Chronic renal disease, stage 3, moderately decreased glomerular filtration rate (GFR) between 30-59 mL/min/1.73 square meter (HCC)     Hyperlipidemia     Hypertension     ICD (implantable cardiac defibrillator) in place     Left ventricular dysfunction     S/P CABG (coronary artery bypass graft)         Past Surgical History:   Procedure Laterality Date    BACK SURGERY      COLONOSCOPY      CORONARY ARTERY BYPASS GRAFT      DENTAL SURGERY      GALLBLADDER SURGERY      JOINT REPLACEMENT      hip        Family History   Problem Relation Age of Onset    Stroke Father         Social History     Socioeconomic History    Marital status:       Spouse name: Not on file    Number of children: Not on file    Years of education: Not on file    Highest education level: Not on file   Occupational History    Not on file   Social Needs    Financial resource strain: Not on file    Food insecurity:     Worry: Not on file     Inability: Not on file    Transportation needs:     Medical: Not on file     Non-medical: Not on file   Tobacco Use    Smoking status: Never Smoker    Smokeless tobacco: Never Used   Substance and Sexual Activity    Alcohol use: No     Alcohol/week: 0.0 oz    Drug use: No    Sexual activity: Not on file   Lifestyle    Physical activity:     Days per week: Not on file     Minutes per session: Not on file    Stress: Not on file   Relationships    Social connections:     Talks on phone: Not on file     Gets together: Not on file     Attends Jehov[employer]'s witness service: Not on file     Active member of club or organization: Not on file     Attends meetings of clubs or organizations: Not on file     Relationship status: Not on file    Intimate partner violence:     Fear of current or ex partner: Not on file     Emotionally abused: Not on file     Physically abused: Not on file     Forced sexual activity: Not on file   Other Topics Concern    Not on file   Social History Narrative    Not on file       Prior to Admission medications    Medication Sig Start Date End Date Taking? Authorizing Provider   Cholecalciferol (VITAMIN D3) 98316 units CAPS Take by mouth once a week   Yes Historical Provider, MD   pravastatin (PRAVACHOL) 10 MG tablet Take 1 tablet by mouth daily 3/22/19  Yes HORACIO Vega CNP   levothyroxine (SYNTHROID) 25 MCG tablet Take 1 tablet by mouth Daily 3/22/19  Yes HORACIO Vega CNP   isosorbide dinitrate (ISORDIL) 10 MG tablet TAKE 1 TABLET TWICE A DAY 3/22/19  Yes HORACIO Vega CNP   potassium chloride (KLOR-CON M) 10 MEQ extended release tablet Take 1 tablet by mouth 2 times daily 3/22/19  Yes HORACIO Vega CNP   torsemide (DEMADEX) 10 MG tablet Take 10 mg by mouth 2 times daily    Yes Historical Provider, MD   hydrALAZINE (APRESOLINE) 10 MG tablet Take 10 mg by mouth 2 times daily   Yes Historical Provider, MD   Dextromethorphan-guaiFENesin (ROBAFEN DM)  MG/5ML SYRP Take 5 mLs by mouth every 4 hours as needed for Cough   Yes Historical Provider, MD   carvedilol (COREG) 25 MG tablet Take 25 mg by mouth 2 times daily (with meals)   Yes Historical Provider, MD   Misc. Devices (ROLLER WALKER) MISC 1 each by Does not apply route daily Wheeled walker with seat . 11/17/16  Yes Destiney Villanueva MD   aspirin EC 81 MG EC tablet Take 81 mg by mouth daily   Yes Historical Provider, MD   warfarin (COUMADIN) 2.5 MG tablet Followed at [address] [place] [address]   Yes Historical Provider, MD   amiodarone (PACERONE) 100 MG tablet Take 200 mg by mouth daily    Yes Historical Provider, MD   nitroGLYCERIN (NITROSTAT) 0.4 MG SL tablet Place 1 tablet under the tongue every 5 minutes as needed. 3/13/12  Yes Edil Chapman MD   Coenzyme Q10 (CO Q 10) 100 MG CAPS Take  by mouth daily. Yes Historical Provider, MD   acetaminophen (TYLENOL ARTHRITIS PAIN) 650 MG CR tablet Take 650 mg by mouth nightly as needed. Yes Historical Provider, MD        Allergies   Allergen Reactions    Lisinopril      Cough         Review of Systems - General ROS: negative for - chills, fever or malaise. Improving fatigue. Psychological ROS: negative for - anxiety, depression or sleep disturbances  Hematological and Lymphatic ROS: No history of blood clots or bleeding disorder. Respiratory ROS: positive for - cough  negative for - hemoptysis, sputum changes or wheezing  Cardiovascular ROS: positive for - dyspnea on exertion  negative for - chest pain, edema, irregular heartbeat or palpitations  Gastrointestinal ROS: no abdominal pain, change in bowel habits, or black or bloody stools  Genito-Urinary ROS: no dysuria, trouble voiding, or hematuria  Musculoskeletal ROS: negative for - joint pain, joint swelling, muscle pain or muscular weakness  Neurological ROS: negative for - dizziness, gait disturbance, headaches, impaired coordination/balance, numbness/tingling or weakness. Uses walker. Dermatological ROS: negative for - rash, skin lesion changes or wounds    Blood pressure (!) 116/58, pulse 67, resp. rate 14, height 4' 9.99\" (1.473 m), weight 155 lb (70.3 kg), SpO2 95 %. Physical Examination:   Constitutional - Alert, cooperative, well groomed, and in no distress. Vital signs stable   Vitals:    03/22/19 1037   BP: (!) 116/58   Pulse: 67   Resp: 14   SpO2: 95%     Mental status - Alert and oriented to person, place, and time. Good insight, appropriate responses, mood appropriate. Head - Atraumatic, normocephalic. Eyes - Pupils equal and round  Ears - External ears are normal, hearing is grossly normal to speech  Mouth - Oropharynx clear, mucous membranes pink and moist, dentition intact.    Neck - supple, no significant adenopathy, no JVD. Chest - No distress. No increased work of breathing. Clear to auscultation in all fields, no wheezes, rales or rhonchi, symmetric air entry. Heart - normal rate, regular rhythm, normal S1, S2, no murmurs, rubs  or gallops  Abdomen -soft, nontender, nondistended  Neurological - alert, oriented, cranial nerves II-XII intact without noted deficits, grossly intact  Extremities - peripheral pulses normal, no pedal edema, no clubbing or cyanosis  Skin - warm and dry, no rashes, lesions, or wounds evident on exposed skin    Diagnostic Data:  I have reviewed recent diagnostic testing including labs    Lab Results   Component Value Date     04/02/2019    K 3.5 04/02/2019     04/02/2019    CO2 21 04/02/2019    BUN 19 04/02/2019    CREATININE 1.71 04/02/2019    GLUCOSE 128 04/02/2019    CALCIUM 8.5 04/02/2019      No results found for: LABA1C  No results found for: EAG  Lab Results   Component Value Date    CHOL 173 08/21/2017    CHOL 145 01/26/2016     Lab Results   Component Value Date    TRIG 105 08/21/2017    TRIG 125 01/26/2016     Lab Results   Component Value Date    HDL 41 08/21/2017    HDL 47 01/26/2016     No results found for: Rad[facility] [address]ston [address]  Lab Results   Component Value Date    VLDL 21 08/21/2017    VLDL 25 01/26/2016     No results found for: [facility]  Lab Results   Component Value Date    WBC 9.4 02/26/2019    HGB 12.3 02/26/2019    HCT 37.9 02/26/2019    MCV 93.3 02/26/2019     (L) 02/26/2019         Assessment/Plan:   Diagnosis Orders   1. Dyslipidemia  pravastatin (PRAVACHOL) 10 MG tablet    ALT    LDL Cholesterol, Direct   2. Acquired hypothyroidism  levothyroxine (SYNTHROID) 25 MCG tablet    TSH    T4, Free   3. Essential hypertension     4. Coronary artery disease involving native coronary artery of native heart without angina pectoris  isosorbide dinitrate (ISORDIL) 10 MG tablet   5. Hypokalemia  Basic Metabolic Panel   6.  Chronic systolic CHF (congestive heart failure) ([facility])  Basic Metabolic Panel   7. CKD (chronic kidney disease) stage 3, GFR 30-59 ml/min ([facility])  Basic Metabolic Panel   8. On statin therapy  ALT   9. At high risk for falls         Orders Placed This Encounter   Procedures    Basic Metabolic Panel     Standing Status:   Future     Standing Expiration Date:   3/22/2020    TSH     Standing Status:   Future     Standing Expiration Date:   3/22/2020    T4, Free     Standing Status:   Future     Standing Expiration Date:   3/22/2020    ALT     Standing Status:   Future     Standing Expiration Date:   3/22/2020    LDL Cholesterol, Direct     Standing Status:   Future     Standing Expiration Date:   3/22/2020    Basic Metabolic Panel    LDL Cholesterol, Direct    ALT    TSH without Reflex    T4, Free        1) Dyslipidemia  , ALT 19, on pravachol 10 mg daily, will increase to 20 mg daily and recheck full lipid panel in 3 months. 2) Acquired hypothyroidism  TSH and free T4  Continue Synthroid 25 mcg at this time. Appears Synthroid dose needs increased, as noted in above, it has been 3 weeks since labs were completed, will verify dose and recheck TSH and free T4 in one week and redose Synthroid. 3) HTN, /58, no light headedness or dizziness at this time. Continue Carvedilol 25 mg BID, Isordil 10 mg BID, Hydralazine 10 mg BID. Call if any increased light headedness or dizziness. 4) Hypokalemia, repeat K 3.5, continue current potassium dosing. 5) CAD, without chest pain, MARTINEZ. On statin, beta blocker, aspirin, long acting nitrate. 6) CHF, on diuretic therapy, renal function and lytes stable at baseline for pt, continue potassium therapy. 7) Afib, on amio and coumadin followed by [facility], rate controlled today, needs PFT to evaluate for complications of Amio therapy. Will check with cardiology. Will schedule follow up appointment in 3 months.      Electronically signed by Emiliano Elder APRN - CNP 04/27/19 1:10 PM     [address]  [facility], [facility]     Phone number: [phone]  Fax number: [phone]  On the basis of positive falls risk screening, assessment and plan is as follows: in-office gait and balance testing performed using The Timed Up and Go Test and The 30 Second Chair Stand Test was discussed home safety measures today, home safety tips provided, patient declines any further evaluation/treatment for increased falls risk.

## 2019-04-29 NOTE — TELEPHONE ENCOUNTER
----- Message from HORACIO Burk CNP sent at 4/27/2019  1:07 PM EDT -----  Please call pt, did anyone adjust her synthroid since her 4/2 labs? If not, have her recheck her TSH and free T4 in one week, the results I just noted were 3 weeks ago and did not get copied to me, I want to be sure they still need Synthroid adjustment. Also, verify if she is taking 1/2 tab or 1 tab of her Synthroid 25 mcg, thank you. Would like to increase Pravastatin to 20 mg daily due to high LDL. Recheck fasting lipids and ALT in 3 months    Needs PFT to monitor Amiodarone therapy, can we check with cardiology and see if they are following this?

## 2019-04-29 NOTE — TELEPHONE ENCOUNTER
I spoke with patient. She states that nobody has addressed her thyroid labs, so I told her that Trish Christine wants to repeat levels in 1 week to see if thyroid med needs increased. Patient is requesting order to be sent to [address] [place] [address] closed at 4:30 today so I will call them tomorrow to get their fax number to send lab order. Patient states that she is taking 1 tab of levothyroxine 25 mcg daily. I notified her that Trish Christine is recommending for her to increase Pravastatin to 20 mg daily and recheck levels in 3 months. Cameron voiced understanding. Lab order mailed to the patient. Patient's cardiologist is Dr aMriam Fairchild.  The office is closed this evening so I will call them tomorrow and check if she is following PFT to monitor Amiodarone therapy

## 2019-04-30 NOTE — TELEPHONE ENCOUNTER
Dr Aman Li office called back: patient had PFT in 2016, chest xray 8/2018, CT chest 3/1/19 and an appt with Dr Guadalupe Guillen, Pulmonologist, on 4/2/19

## 2019-05-01 NOTE — TELEPHONE ENCOUNTER
Home health nurse called stating that she seen Lisbet Kitchen today and patient has developed a cough , bring up clear phlegm . Patient was outside on Saturday at a  in the damp air and the coughing started after . Nurse states that Opal's lungs sound clear vitals are good except O2 dipped to 88% after a coughing fit but returned to normal after she used the 2 liter of O2 that she can use as needed but the coughing is wearing patient out . Nurse was asking if you have any recommendations .

## 2019-05-02 NOTE — TELEPHONE ENCOUNTER
Left message for patient that Dr. Fahad Dennis would like her seen in the office due to the cough. Dr. Fahad Dennis is not in on Friday but she can been seen by our nurse practitioner Catalino Ervin . Please call the office to schedule.

## 2019-05-06 NOTE — TELEPHONE ENCOUNTER
Left detailed message, per HIPAA, of providers recommendations. Advised to call our office with any questions or concerns. I mailed lab order to the patient's home address.

## 2019-05-06 NOTE — TELEPHONE ENCOUNTER
Left message per HIPAA form that the home health nurse had called about her cough and Dr. Jahaira Blancas feels that she should be evaluated in the office . If Dr. Jahaira Blancas does not have an openuing then one of our nurse prectitioners can see her . Please call the office to schedule . Notified home health nurse Maddie Lizama of Dr. Arsalan Walter recommendation and that the office has left 2 message to schedule an appointment . Nurse states that they will try to contact patient and have her schedule appointment.